# Patient Record
Sex: FEMALE | Race: WHITE
[De-identification: names, ages, dates, MRNs, and addresses within clinical notes are randomized per-mention and may not be internally consistent; named-entity substitution may affect disease eponyms.]

---

## 2019-03-25 ENCOUNTER — HOSPITAL ENCOUNTER (OUTPATIENT)
Dept: HOSPITAL 10 - GIL | Age: 59
Discharge: HOME | End: 2019-03-25
Attending: INTERNAL MEDICINE
Payer: COMMERCIAL

## 2019-03-25 ENCOUNTER — HOSPITAL ENCOUNTER (OUTPATIENT)
Dept: HOSPITAL 91 - GIL | Age: 59
Discharge: HOME | End: 2019-03-25
Payer: COMMERCIAL

## 2019-03-25 VITALS — SYSTOLIC BLOOD PRESSURE: 154 MMHG | HEART RATE: 60 BPM | RESPIRATION RATE: 16 BRPM | DIASTOLIC BLOOD PRESSURE: 70 MMHG

## 2019-03-25 VITALS
HEIGHT: 63 IN | WEIGHT: 203.49 LBS | WEIGHT: 203.49 LBS | BODY MASS INDEX: 36.05 KG/M2 | BODY MASS INDEX: 36.05 KG/M2 | HEIGHT: 63 IN

## 2019-03-25 VITALS — HEART RATE: 62 BPM | SYSTOLIC BLOOD PRESSURE: 173 MMHG | RESPIRATION RATE: 17 BRPM | DIASTOLIC BLOOD PRESSURE: 77 MMHG

## 2019-03-25 DIAGNOSIS — E78.5: ICD-10-CM

## 2019-03-25 DIAGNOSIS — E11.9: ICD-10-CM

## 2019-03-25 DIAGNOSIS — I10: ICD-10-CM

## 2019-03-25 DIAGNOSIS — Z12.11: Primary | ICD-10-CM

## 2019-03-25 DIAGNOSIS — K64.8: ICD-10-CM

## 2019-03-25 PROCEDURE — 45378 DIAGNOSTIC COLONOSCOPY: CPT

## 2019-03-25 PROCEDURE — 82962 GLUCOSE BLOOD TEST: CPT

## 2019-03-25 NOTE — HPN
Date/Time of Note


Date/Time of Note


DATE: 3/25/19 


TIME: 14:08





Interval H&P Admission Note


Pt. seen H&P reviewed:  No system changes











NATASHA TERESA                     Mar 25, 2019 14:08

## 2019-03-25 NOTE — PAC
Date/Time of Note


Date/Time of Note


DATE: 3/25/19 


TIME: 14:29





Post-Anesthesia Notes


Post-Anesthesia Note


Last documented vital signs





Vital Signs


  Date      Temp  Pulse  Resp  B/P (MAP)   Pulse Ox  O2          O2 Flow    FiO2


Time                                                 Delivery    Rate


   3/25/19  98.2     62    17      173/77        99  Room Air


     13:09                          (109)





Activity:  WNL


Respiratory function:  WNL


Cardiovascular function:  WNL


Mental status:  Baseline


Pain reasonably controlled:  Yes


Hydration appropriate:  Yes


Nausea/Vomiting absent:  Yes


Comments


BP:112/56, P:76, Spo2:100%, T:98,8











ЕЛЕНА PARRA MD              Mar 25, 2019 14:30

## 2019-03-25 NOTE — PREAC
Date/Time of Note


Date/Time of Note


DATE: 3/25/19 


TIME: 14:03





Anesthesia Eval and Record


Evaluation


Time Pre-Procedure Interview


DATE: 3/25/19 


TIME: 14:03


Age


58


Sex


female


NPO:  8 hrs


Preoperative diagnosis


screening


Planned procedure


colonoscopy





Past Medical History


Past Medical History:  Includes


Cardio:  HTN, Dyslipidemia


Endo:  Diabetes





Surgery & Anesthesia Issues


No known issue





Meds


Anticoagulation:  No


Beta Blocker within 24 hr:  Yes


Reported Medications


Aspirin* (Aspirin* Chew) 81 Mg Tab.chew, 81 MG PO DAILY, TAB.CHEW


   3/25/19


[atenolol]   No Conflict Check, PO DAILY


   3/25/19


[omeprazole]   No Conflict Check, PO DAILY


   3/25/19


[hydrochlorothiazide]   No Conflict Check, PO DAILY


   3/25/19


[glimepiride]   No Conflict Check, PO DAILY


   3/25/19


Meds reviewed:  Yes





Allergies


Coded Allergies:  


     No Known Allergy (Unverified , 3/25/19)


Allergies Reviewed:  Yes





Labs/Studies


Labs Reviewed:  Reviewed by anesthesiologist


Pregnancy test:  N/A





Pre-procedure Exam


Last vitals





Vital Signs


  Date      Temp  Pulse  Resp  B/P (MAP)   Pulse Ox  O2          O2 Flow    FiO2


Time                                                 Delivery    Rate


   3/25/19  98.2     62    17      173/77        99  Room Air


     13:09                          (109)





Airway:  Adequate mouth opening, Adequate thyromental dist


Mallampati:  Mallampati II


Teeth:  Normal (lower partials in place, pt states it is glued very tightly)


Lung:  Normal


Heart:  Normal





ASA Physical Status


ASA physical status:  2


Emergency:  None





Planned Anesthetic


General/MAC:  MAC





Pre-operative Attestations


Prior to commencing anesthesia and surgery, the patient was re-evaluated, there 


was verification of:


*The patient's identity


*The results of appropriate recent lab work and preoperative vital signs


*The above evaluation not changing prior to induction


*Anesthetic plan, risk benefits, alternative and complications discussed with 


patient/family; questions answered; patient/family understands, accepts and 


wishes to proceed.


 used











ODESSA PAYNE                  Mar 25, 2019 14:04